# Patient Record
Sex: FEMALE | Race: WHITE | URBAN - NONMETROPOLITAN AREA
[De-identification: names, ages, dates, MRNs, and addresses within clinical notes are randomized per-mention and may not be internally consistent; named-entity substitution may affect disease eponyms.]

---

## 2020-10-20 ENCOUNTER — OFFICE VISIT (OUTPATIENT)
Dept: OBGYN CLINIC | Age: 20
End: 2020-10-20
Payer: COMMERCIAL

## 2020-10-20 VITALS
WEIGHT: 144.3 LBS | HEIGHT: 66 IN | BODY MASS INDEX: 23.19 KG/M2 | SYSTOLIC BLOOD PRESSURE: 124 MMHG | DIASTOLIC BLOOD PRESSURE: 86 MMHG

## 2020-10-20 PROCEDURE — 99203 OFFICE O/P NEW LOW 30 MIN: CPT | Performed by: NURSE PRACTITIONER

## 2020-10-20 RX ORDER — HYDROXYZINE HYDROCHLORIDE 25 MG/1
TABLET, FILM COATED ORAL
COMMUNITY
Start: 2020-10-08 | End: 2022-03-31 | Stop reason: ALTCHOICE

## 2020-10-20 RX ORDER — NORETHINDRONE AND ETHINYL ESTRADIOL 7 DAYS X 3
KIT ORAL
COMMUNITY
Start: 2020-08-29 | End: 2021-02-11 | Stop reason: ALTCHOICE

## 2020-10-20 RX ORDER — ESCITALOPRAM OXALATE 10 MG/1
TABLET ORAL
COMMUNITY
Start: 2020-10-08 | End: 2022-03-31 | Stop reason: ALTCHOICE

## 2020-10-20 SDOH — HEALTH STABILITY: MENTAL HEALTH: HOW OFTEN DO YOU HAVE A DRINK CONTAINING ALCOHOL?: MONTHLY OR LESS

## 2020-10-20 NOTE — PROGRESS NOTES
Genitourinary:        Decreased libido         Physical Exam  Constitutional:       Appearance: Normal appearance. HENT:      Head: Normocephalic. Cardiovascular:      Rate and Rhythm: Normal rate. Pulmonary:      Effort: Pulmonary effort is normal.   Musculoskeletal: Normal range of motion. Neurological:      General: No focal deficit present. Mental Status: She is alert. Psychiatric:         Mood and Affect: Mood normal.         Behavior: Behavior normal.          Assessment and Plan          Diagnosis Orders   1. Fatigue, unspecified type  TSH With Reflex Ft4    Vitamin D 25 Hydroxy    Vitamin B12   2. Decreased libido  levonorgestrel-ethinyl estradiol (LYBREL) 90-20 MCG per tablet   3. Irregular menstruation  levonorgestrel-ethinyl estradiol (LYBREL) 90-20 MCG per tablet       Patient recognizes symptoms can be related to stressful job changes, not sleeping well, anxiety/depression. Discussed appropriate sleep patterns, exercise, healthy eating. Patient still desires trial of alternative OCP to see if libido will improve. Patient denies risk factors for use of OCP. Reviewed risks/benefits, administration, bleeding patterns, ACHES- patient verbalizes understanding. I am having [address] [place] [address] NEIL Bassett start on levonorgestrel-ethinyl estradiol. I am also having her maintain her escitalopram, hydrOXYzine, and Pirmella 7/7/7. Return in about 3 months (around 1/20/2021) for med check. There are no Patient Instructions on file for this visit. Over 50% of time spent on counseling and care coordination on: see assessment and plan,  She was also counseled on her preventative health maintenance recommendations and follow-up.         FF time: 30 min      Ha Selby,10/21/2020 9:37 PM

## 2020-10-21 RX ORDER — LEVONORGESTREL AND ETHINYL ESTRADIOL 90; 20 UG/1; UG/1
1 TABLET ORAL DAILY
Qty: 28 TABLET | Refills: 3 | Status: SHIPPED | OUTPATIENT
Start: 2020-10-21 | End: 2021-02-09

## 2021-02-09 DIAGNOSIS — R68.82 DECREASED LIBIDO: ICD-10-CM

## 2021-02-09 DIAGNOSIS — N92.6 IRREGULAR MENSTRUATION: ICD-10-CM

## 2021-02-09 RX ORDER — LEVONORGESTREL AND ETHINYL ESTRADIOL 90; 20 UG/1; UG/1
TABLET ORAL
Qty: 28 TABLET | Refills: 0 | Status: SHIPPED | OUTPATIENT
Start: 2021-02-09 | End: 2021-02-11 | Stop reason: SDUPTHER

## 2021-02-11 ENCOUNTER — OFFICE VISIT (OUTPATIENT)
Dept: OBGYN CLINIC | Age: 21
End: 2021-02-11
Payer: COMMERCIAL

## 2021-02-11 ENCOUNTER — HOSPITAL ENCOUNTER (OUTPATIENT)
Age: 21
Setting detail: SPECIMEN
Discharge: HOME | End: 2021-02-11
Payer: COMMERCIAL

## 2021-02-11 VITALS — SYSTOLIC BLOOD PRESSURE: 128 MMHG | BODY MASS INDEX: 23.02 KG/M2 | DIASTOLIC BLOOD PRESSURE: 79 MMHG | WEIGHT: 142.6 LBS

## 2021-02-11 DIAGNOSIS — R68.82 DECREASED LIBIDO: ICD-10-CM

## 2021-02-11 DIAGNOSIS — N92.6 IRREGULAR MENSTRUATION: Primary | ICD-10-CM

## 2021-02-11 DIAGNOSIS — R53.83 FATIGUE, UNSPECIFIED TYPE: ICD-10-CM

## 2021-02-11 LAB
THYROXINE, FREE: 1.48 NG/DL (ref 0.93–1.7)
TSH SERPL DL<=0.05 MIU/L-ACNC: 6.64 MIU/L (ref 0.3–5)
VITAMIN B-12: 614 PG/ML (ref 232–1245)
VITAMIN D 25-HYDROXY: 32 NG/ML (ref 30–100)

## 2021-02-11 PROCEDURE — 36415 COLL VENOUS BLD VENIPUNCTURE: CPT | Performed by: NURSE PRACTITIONER

## 2021-02-11 PROCEDURE — 99213 OFFICE O/P EST LOW 20 MIN: CPT | Performed by: NURSE PRACTITIONER

## 2021-02-11 RX ORDER — LEVONORGESTREL AND ETHINYL ESTRADIOL 90; 20 UG/1; UG/1
TABLET ORAL
Qty: 28 TABLET | Refills: 12 | Status: SHIPPED | OUTPATIENT
Start: 2021-02-11 | End: 2021-03-15 | Stop reason: SDUPTHER

## 2021-02-11 ASSESSMENT — ENCOUNTER SYMPTOMS
GASTROINTESTINAL NEGATIVE: 1
RESPIRATORY NEGATIVE: 1

## 2021-02-11 NOTE — PROGRESS NOTES
PROBLEM VISIT     Date of service: 2021    Leena George  Is a 23 y.o. female    PT's PCP is: KESHAWN Becerra - CNP     : 2000                                             Subjective:       No LMP recorded. (Menstrual status: Other - See Notes). OB History    Para Term  AB Living   0 0 0 0 0 0   SAB TAB Ectopic Molar Multiple Live Births   0 0 0 0 0 0        Social History     Tobacco Use   Smoking Status Never Smoker   Smokeless Tobacco Never Used        Social History     Substance and Sexual Activity   Alcohol Use Yes    Frequency: Monthly or less       Allergies: Cephalexin      Current Outpatient Medications:     levonorgestrel-ethinyl estradiol (TACOS) 90-20 MCG per tablet, Take 1 tablet by mouth once daily, Disp: 28 tablet, Rfl: 12    escitalopram (LEXAPRO) 10 MG tablet, TAKE 1 TABLET BY MOUTH ONCE DAILY FOR 90 DAYS, Disp: , Rfl:     hydrOXYzine (ATARAX) 25 MG tablet, TAKE 1 TABLET BY MOUTH EVERY 8 HOURS AS NEEDED FOR 30 DAYS, Disp: , Rfl:     Social History     Substance and Sexual Activity   Sexual Activity Yes    Partners: Male    Birth control/protection: OCP     Chief Complaint   Patient presents with    Follow-up     Follow up OCPs for fatigue and irregular menses. No menses with current OCP. Fatigue has not gotten any better. PE:  Vital Signs  Blood pressure 128/79, weight 142 lb 9.6 oz (64.7 kg). HPI: Patient here for med check. No menses with current OCP, but no improvement in fatigue. Patient never had labs drawn- plans to do so today. PT denies fever, chills, nausea and vomiting       Review of Systems   Constitutional: Positive for fatigue. Negative for appetite change and unexpected weight change. Respiratory: Negative. Cardiovascular: Negative. Gastrointestinal: Negative. Endocrine: Negative. Genitourinary: Negative. Neurological: Negative.         Physical Exam  Constitutional:       Appearance: Normal appearance. HENT:      Head: Normocephalic. Pulmonary:      Effort: Pulmonary effort is normal.   Musculoskeletal: Normal range of motion. Neurological:      General: No focal deficit present. Mental Status: She is alert. Psychiatric:         Mood and Affect: Mood normal.         Behavior: Behavior normal.         Assessment and Plan          Diagnosis Orders   1. Irregular menstruation  levonorgestrel-ethinyl estradiol (TACOS) 90-20 MCG per tablet   2. Fatigue, unspecified type  CBC Auto Differential   3. Decreased libido  levonorgestrel-ethinyl estradiol (TACOS) 90-20 MCG per tablet       will await lab results, if normal labs and no improvement in fatigue encouraged follow up with PCP. Desires refill of current OCP- reviewed risks/benefits, aches     I have discontinued Leticia Bassett's Pirmella 7/7/7. I have also changed her Tacos to levonorgestrel-ethinyl estradiol. Additionally, I am having her maintain her escitalopram and hydrOXYzine. Return if symptoms worsen or fail to improve. There are no Patient Instructions on file for this visit.     Gary Selby,2/11/2021 1:09 PM

## 2021-02-15 NOTE — RESULT ENCOUNTER NOTE
Spoke to patient and reviewed her results and recommendations. Copy of labs faxed to Ruthy Bell, [facility] at [phone]. Patient is aware that someone will call her with recommendations.

## 2021-03-15 DIAGNOSIS — N92.6 IRREGULAR MENSTRUATION: ICD-10-CM

## 2021-03-15 DIAGNOSIS — R68.82 DECREASED LIBIDO: ICD-10-CM

## 2021-03-15 RX ORDER — LEVONORGESTREL AND ETHINYL ESTRADIOL 90; 20 UG/1; UG/1
TABLET ORAL
Qty: 28 TABLET | Refills: 12 | Status: SHIPPED | OUTPATIENT
Start: 2021-03-15 | End: 2022-03-24 | Stop reason: SDUPTHER

## 2021-03-15 NOTE — TELEPHONE ENCOUNTER
Pt calling to restart Maryuri. States you told her if she wanted to go back on it she could just call.

## 2022-03-16 DIAGNOSIS — N92.6 IRREGULAR MENSTRUATION: ICD-10-CM

## 2022-03-16 DIAGNOSIS — R68.82 DECREASED LIBIDO: ICD-10-CM

## 2022-03-16 RX ORDER — LEVONORGESTREL AND ETHINYL ESTRADIOL 90; 20 UG/1; UG/1
TABLET ORAL
Qty: 28 TABLET | Refills: 0 | OUTPATIENT
Start: 2022-03-16

## 2022-03-18 DIAGNOSIS — R68.82 DECREASED LIBIDO: ICD-10-CM

## 2022-03-18 DIAGNOSIS — N92.6 IRREGULAR MENSTRUATION: ICD-10-CM

## 2022-03-21 DIAGNOSIS — R68.82 DECREASED LIBIDO: ICD-10-CM

## 2022-03-21 DIAGNOSIS — N92.6 IRREGULAR MENSTRUATION: ICD-10-CM

## 2022-03-21 RX ORDER — LEVONORGESTREL AND ETHINYL ESTRADIOL 90; 20 UG/1; UG/1
TABLET ORAL
Qty: 28 TABLET | Refills: 0 | OUTPATIENT
Start: 2022-03-21

## 2022-03-22 RX ORDER — LEVONORGESTREL AND ETHINYL ESTRADIOL 90; 20 UG/1; UG/1
TABLET ORAL
Qty: 28 TABLET | Refills: 0 | OUTPATIENT
Start: 2022-03-22

## 2022-03-23 DIAGNOSIS — N92.6 IRREGULAR MENSTRUATION: ICD-10-CM

## 2022-03-23 DIAGNOSIS — R68.82 DECREASED LIBIDO: ICD-10-CM

## 2022-03-23 RX ORDER — LEVONORGESTREL AND ETHINYL ESTRADIOL 90; 20 UG/1; UG/1
TABLET ORAL
Qty: 28 TABLET | Refills: 0 | OUTPATIENT
Start: 2022-03-23

## 2022-03-24 DIAGNOSIS — N92.6 IRREGULAR MENSTRUATION: ICD-10-CM

## 2022-03-24 DIAGNOSIS — R68.82 DECREASED LIBIDO: ICD-10-CM

## 2022-03-24 RX ORDER — LEVONORGESTREL AND ETHINYL ESTRADIOL 90; 20 UG/1; UG/1
TABLET ORAL
Qty: 28 TABLET | Refills: 0 | Status: SHIPPED | OUTPATIENT
Start: 2022-03-24 | End: 2022-03-31 | Stop reason: SDUPTHER

## 2022-03-31 ENCOUNTER — OFFICE VISIT (OUTPATIENT)
Dept: OBGYN CLINIC | Age: 22
End: 2022-03-31
Payer: COMMERCIAL

## 2022-03-31 ENCOUNTER — HOSPITAL ENCOUNTER (OUTPATIENT)
Age: 22
Setting detail: SPECIMEN
Discharge: HOME | End: 2022-03-31

## 2022-03-31 VITALS
DIASTOLIC BLOOD PRESSURE: 78 MMHG | SYSTOLIC BLOOD PRESSURE: 122 MMHG | BODY MASS INDEX: 24.08 KG/M2 | HEIGHT: 66 IN | WEIGHT: 149.8 LBS

## 2022-03-31 DIAGNOSIS — N92.6 IRREGULAR MENSTRUATION: ICD-10-CM

## 2022-03-31 DIAGNOSIS — Z01.419 WOMEN'S ANNUAL ROUTINE GYNECOLOGICAL EXAMINATION: Primary | ICD-10-CM

## 2022-03-31 PROBLEM — E03.9 HYPOTHYROIDISM: Status: ACTIVE | Noted: 2021-12-14

## 2022-03-31 PROBLEM — F33.1 MODERATE EPISODE OF RECURRENT MAJOR DEPRESSIVE DISORDER (HCC): Status: ACTIVE | Noted: 2021-12-14

## 2022-03-31 PROCEDURE — 99395 PREV VISIT EST AGE 18-39: CPT | Performed by: NURSE PRACTITIONER

## 2022-03-31 RX ORDER — LEVONORGESTREL AND ETHINYL ESTRADIOL 90; 20 UG/1; UG/1
TABLET ORAL
Qty: 28 TABLET | Refills: 12 | Status: SHIPPED | OUTPATIENT
Start: 2022-03-31 | End: 2022-06-30 | Stop reason: SDUPTHER

## 2022-03-31 RX ORDER — FLUOXETINE HYDROCHLORIDE 40 MG/1
CAPSULE ORAL
COMMUNITY
Start: 2022-03-15

## 2022-03-31 RX ORDER — LEVOTHYROXINE SODIUM 75 UG/1
TABLET ORAL
COMMUNITY
Start: 2022-03-15

## 2022-03-31 ASSESSMENT — ENCOUNTER SYMPTOMS
ABDOMINAL PAIN: 0
SHORTNESS OF BREATH: 0
DIARRHEA: 0
CONSTIPATION: 0

## 2022-03-31 NOTE — PROGRESS NOTES
YEARLY PHYSICAL    Date of service: 3/31/2022    Bhakti Alonso  Is a 24 y.o.  single female    PT's PCP is: KESHAWN Valdivia CNP     : 2000                                         Chaperone for Intimate Exam   Chaperone was offered as part of the rooming process. Patient declined and agrees to continue with exam without a chaperone.  Chaperone: n/a      Subjective:       No LMP recorded. (Menstrual status: Other - See Notes). Are your menses regular: no menses with OCP    OB History    Para Term  AB Living   0 0 0 0 0 0   SAB IAB Ectopic Molar Multiple Live Births   0 0 0 0 0 0        Social History     Tobacco Use   Smoking Status Never Smoker   Smokeless Tobacco Never Used        Social History     Substance and Sexual Activity   Alcohol Use Yes       Family History   Problem Relation Age of Onset    Diabetes Father        Any family history of breast or ovarian cancer: No    Any family history of blood clots: No      Allergies: Cephalexin      Current Outpatient Medications:     levonorgestrel-ethinyl estradiol (TACOS) 90-20 MCG per tablet, Take 1 tablet by mouth once daily, Disp: 28 tablet, Rfl: 12    EUTHYROX 75 MCG tablet, TAKE 1 TABLET BY MOUTH ONCE DAILY, Disp: , Rfl:     FLUoxetine (PROZAC) 40 MG capsule, TAKE 1 CAPSULE BY MOUTH ONCE DAILY, Disp: , Rfl:     Social History     Substance and Sexual Activity   Sexual Activity Yes    Partners: Male    Birth control/protection: OCP       Any bleeding or pain with intercourse: No    Last Yearly:  n/a    Last pap: n/a    Last HPV: n/a    Last Mammogram: n/a    Last Dexascan n/a    Last colorectal screen- type:n/a*  date  n/a    Do you do self breast exams: encouraged     Past Medical History:   Diagnosis Date    Anxiety     Depression     Thyroid disease        History reviewed. No pertinent surgical history.     Family History   Problem Relation Age of Onset    Diabetes Father        Chief Complaint   Patient presents with    Gynecologic Exam     First pap. No menses with OCP. Declines STD testing. Voices no concerns. PE:  Vital Signs  Blood pressure 122/78, height 5' 6\" (1.676 m), weight 149 lb 12.8 oz (67.9 kg). Estimated body mass index is 24.18 kg/m² as calculated from the following:    Height as of this encounter: 5' 6\" (1.676 m). Weight as of this encounter: 149 lb 12.8 oz (67.9 kg). HPI: Patient presents today for annual exam. Feeling well, voices no concerns. Denies breast/pelvic pain. Due for pap, declines STD screening. No menses with current OCP. Getting  in October. Review of Systems   Constitutional: Negative for chills, fatigue and fever. Respiratory: Negative for shortness of breath. Cardiovascular: Negative for chest pain. Gastrointestinal: Negative for abdominal pain, constipation and diarrhea. Genitourinary: Negative for dysuria, enuresis, frequency, menstrual problem, pelvic pain, urgency and vaginal bleeding. Neurological: Negative for dizziness, light-headedness and headaches. Physical Exam  Constitutional:       General: She is not in acute distress. Appearance: Normal appearance. She is not ill-appearing. Genitourinary:      Vulva normal.      No vaginal discharge. Right Adnexa: not tender. Left Adnexa: not tender. Uterus is not tender. Breasts:      Right: No mass, nipple discharge, skin change or tenderness. Left: No mass, nipple discharge, skin change or tenderness. HENT:      Head: Normocephalic. Cardiovascular:      Rate and Rhythm: Normal rate and regular rhythm. Pulmonary:      Effort: Pulmonary effort is normal.      Breath sounds: Normal breath sounds. Abdominal:      Palpations: Abdomen is soft. Tenderness: There is no abdominal tenderness. There is no guarding or rebound. Musculoskeletal:         General: Normal range of motion. Neurological:      General: No focal deficit present. Mental Status: She is alert. Psychiatric:         Mood and Affect: Mood normal.         Behavior: Behavior normal.                              Assessment and Plan          Diagnosis Orders   1. Women's annual routine gynecological examination  PAP SMEAR   2. Irregular menstruation  levonorgestrel-ethinyl estradiol (TACOS) 90-20 MCG per tablet       Repeat Annual every 1 year  Cervical Cytology Evaluation begins at 24years old. If Negative Cytology, Follow-up screening per current guidelines. Mammograms every 1year. If 35 yo and last mammogram was negative. Routine healthmaintenance per patients PCP. I have discontinued Deandra Bassett's escitalopram and hydrOXYzine. I am also having her maintain her Euthyrox, FLUoxetine, and levonorgestrel-ethinyl estradiol. Return in about 1 year (around 3/31/2023) for yearly. She was also counseled on her preventative health maintenance recommendations and follow-up. There are no Patient Instructions on file for this visit.     KESHAWN Garcia NP,3/31/2022 11:47 AM

## 2022-04-07 LAB — CYTOLOGY REPORT: NORMAL

## 2022-06-30 DIAGNOSIS — N92.6 IRREGULAR MENSTRUATION: ICD-10-CM

## 2022-06-30 RX ORDER — LEVONORGESTREL AND ETHINYL ESTRADIOL 90; 20 UG/1; UG/1
TABLET ORAL
Qty: 84 TABLET | Refills: 3 | Status: SHIPPED | OUTPATIENT
Start: 2022-06-30